# Patient Record
Sex: FEMALE | Race: WHITE | NOT HISPANIC OR LATINO | Employment: FULL TIME | URBAN - METROPOLITAN AREA
[De-identification: names, ages, dates, MRNs, and addresses within clinical notes are randomized per-mention and may not be internally consistent; named-entity substitution may affect disease eponyms.]

---

## 2023-09-18 ENCOUNTER — OFFICE VISIT (OUTPATIENT)
Dept: ALLERGY | Facility: CLINIC | Age: 23
End: 2023-09-18
Payer: COMMERCIAL

## 2023-09-18 ENCOUNTER — LAB VISIT (OUTPATIENT)
Dept: LAB | Facility: HOSPITAL | Age: 23
End: 2023-09-18
Payer: COMMERCIAL

## 2023-09-18 VITALS — HEIGHT: 65 IN | BODY MASS INDEX: 22.15 KG/M2 | WEIGHT: 132.94 LBS

## 2023-09-18 DIAGNOSIS — T78.3XXA ANGIOEDEMA, INITIAL ENCOUNTER: Primary | ICD-10-CM

## 2023-09-18 DIAGNOSIS — R10.84 GENERALIZED ABDOMINAL PAIN: ICD-10-CM

## 2023-09-18 DIAGNOSIS — R06.02 SHORTNESS OF BREATH: ICD-10-CM

## 2023-09-18 DIAGNOSIS — T78.3XXA ANGIOEDEMA, INITIAL ENCOUNTER: ICD-10-CM

## 2023-09-18 PROCEDURE — 99204 OFFICE O/P NEW MOD 45 MIN: CPT | Mod: S$GLB,,, | Performed by: ALLERGY & IMMUNOLOGY

## 2023-09-18 PROCEDURE — 1159F MED LIST DOCD IN RCRD: CPT | Mod: CPTII,S$GLB,, | Performed by: ALLERGY & IMMUNOLOGY

## 2023-09-18 PROCEDURE — 86003 ALLG SPEC IGE CRUDE XTRC EA: CPT | Mod: 59 | Performed by: ALLERGY & IMMUNOLOGY

## 2023-09-18 PROCEDURE — 86161 COMPLEMENT/FUNCTION ACTIVITY: CPT | Performed by: ALLERGY & IMMUNOLOGY

## 2023-09-18 PROCEDURE — 99204 PR OFFICE/OUTPT VISIT, NEW, LEVL IV, 45-59 MIN: ICD-10-PCS | Mod: S$GLB,,, | Performed by: ALLERGY & IMMUNOLOGY

## 2023-09-18 PROCEDURE — 1159F PR MEDICATION LIST DOCUMENTED IN MEDICAL RECORD: ICD-10-PCS | Mod: CPTII,S$GLB,, | Performed by: ALLERGY & IMMUNOLOGY

## 2023-09-18 PROCEDURE — 3008F PR BODY MASS INDEX (BMI) DOCUMENTED: ICD-10-PCS | Mod: CPTII,S$GLB,, | Performed by: ALLERGY & IMMUNOLOGY

## 2023-09-18 PROCEDURE — 86160 COMPLEMENT ANTIGEN: CPT | Performed by: ALLERGY & IMMUNOLOGY

## 2023-09-18 PROCEDURE — 88184 FLOWCYTOMETRY/ TC 1 MARKER: CPT | Performed by: ALLERGY & IMMUNOLOGY

## 2023-09-18 PROCEDURE — 99999 PR PBB SHADOW E&M-EST. PATIENT-LVL III: CPT | Mod: PBBFAC,,, | Performed by: ALLERGY & IMMUNOLOGY

## 2023-09-18 PROCEDURE — 1160F RVW MEDS BY RX/DR IN RCRD: CPT | Mod: CPTII,S$GLB,, | Performed by: ALLERGY & IMMUNOLOGY

## 2023-09-18 PROCEDURE — 1160F PR REVIEW ALL MEDS BY PRESCRIBER/CLIN PHARMACIST DOCUMENTED: ICD-10-PCS | Mod: CPTII,S$GLB,, | Performed by: ALLERGY & IMMUNOLOGY

## 2023-09-18 PROCEDURE — 36415 COLL VENOUS BLD VENIPUNCTURE: CPT | Mod: PO | Performed by: ALLERGY & IMMUNOLOGY

## 2023-09-18 PROCEDURE — 99999 PR PBB SHADOW E&M-EST. PATIENT-LVL III: ICD-10-PCS | Mod: PBBFAC,,, | Performed by: ALLERGY & IMMUNOLOGY

## 2023-09-18 PROCEDURE — 86003 ALLG SPEC IGE CRUDE XTRC EA: CPT | Performed by: ALLERGY & IMMUNOLOGY

## 2023-09-18 PROCEDURE — 3008F BODY MASS INDEX DOCD: CPT | Mod: CPTII,S$GLB,, | Performed by: ALLERGY & IMMUNOLOGY

## 2023-09-18 RX ORDER — ALBUTEROL SULFATE 90 UG/1
AEROSOL, METERED RESPIRATORY (INHALATION)
COMMUNITY
Start: 2023-04-27

## 2023-09-18 RX ORDER — NORETHINDRONE ACETATE AND ETHINYL ESTRADIOL, ETHINYL ESTRADIOL AND FERROUS FUMARATE 1MG-10(24)
1 KIT ORAL
COMMUNITY
Start: 2023-09-11

## 2023-09-18 RX ORDER — POLYETHYLENE GLYCOL 3350 17 G/17G
POWDER, FOR SOLUTION ORAL
COMMUNITY
Start: 2023-04-20

## 2023-09-18 RX ORDER — PROMETHAZINE HYDROCHLORIDE 25 MG/1
25 TABLET ORAL
COMMUNITY
Start: 2023-04-20

## 2023-09-18 RX ORDER — MONTELUKAST SODIUM 10 MG/1
10 TABLET ORAL
COMMUNITY
Start: 2023-04-27

## 2023-09-18 NOTE — PROGRESS NOTES
Subjective:       Patient ID: Edwige Valenzuela is a 22 y.o. female.    Chief Complaint:  Allergies (Concerned about food allergies)      23 yo woman presents for consult from NP Tamara Abarca for possible allergic reactions. She states this started about 2 years ago. First she developed reaction to tomato. Started off with scratchy throat then progressed to more throat tight and swelling even with tiny bite of ketchup. Then developed same to apricot but developed faster. Then 2 weeks ago had throat tightness with apple juice. And lastly was last week. She ate [employer] chicken then 2 days later had same and this time within 10 minutes throat got tight, she was wheezing and SOB. Had to be taken to ER. Each episode she had gone to ER and given epi and other med's. Pretty quickly she gets better but take couple hours to resolve completely. Never had hives. Never has face, eye or lip swelling. She does feel SOB, hard to swallow, hard to talk. No allergies prior to 2 years ago. She feels like all started after having Covid which she has had 3 times. She does vape but episodes have not been associated with vaping. She has no chronic rhinitis. No asthma. No eczema. Has been having GI issues as well since having Covid. No other medical issues.         Environmental History: see history section for home environment  Review of Systems   Constitutional:  Negative for chills and fever.   HENT:  Positive for sore throat and trouble swallowing. Negative for congestion, ear discharge, facial swelling, nosebleeds, postnasal drip, rhinorrhea, sinus pressure and sneezing.    Eyes:  Negative for discharge, redness and itching.   Respiratory:  Positive for shortness of breath.    Skin:  Negative for color change and rash.        Objective:      Physical Exam  Vitals and nursing note reviewed.   Constitutional:       General: She is not in acute distress.     Appearance: Normal appearance. She is not ill-appearing.   HENT:      Nose: No  rhinorrhea.   Eyes:      General:         Right eye: No discharge.         Left eye: No discharge.      Conjunctiva/sclera: Conjunctivae normal.   Pulmonary:      Effort: Pulmonary effort is normal. No respiratory distress.   Abdominal:      General: There is no distension.   Skin:     General: Skin is warm and dry.      Findings: No erythema or rash.   Neurological:      Mental Status: She is alert and oriented to person, place, and time.   Psychiatric:         Mood and Affect: Mood normal.         Behavior: Behavior normal.         Laboratory:   none performed   Assessment:       1. Angioedema, initial encounter    2. Shortness of breath    3. Generalized abdominal pain         Plan:       Advised pt symptoms could be allergic reaction vs laryngospasm vs esophageal issue, will send immunocaps to eval for allergy. If negative may need ENT eval  Continue to avoid tomato, apple, and apricot for now  Phone review  WILLIAM Abarca notified of completed consult via Epic    I spent a total of 45 minutes on the day of the visit.  This includes face to face time and non-face to face time preparing to see the patient (eg, review of tests), obtaining and/or reviewing separately obtained history, documenting clinical information in the electronic or other health record, independently interpreting results and communicating results to the patient/family/caregiver, or care coordinator.\

## 2023-09-21 LAB
ALMOND IGE QN: <0.1 KU/L
APPLE IGE QN: <0.1 KU/L
APRICOT IGE QN: <0.1 KU/L
AVOCADO IGE QN: <0.1 KU/L
BAHIA GRASS IGE QN: <0.1 KU/L
BALD CYPRESS IGE QN: <0.1 KU/L
BANANA IGE QN: 0.13 KU/L
BERMUDA GRASS IGE QN: <0.1 KU/L
BLACK PEPPER IGE QN: <0.1 KU/L
CELERY IGE QN: 0.29 KU/L
CHILI PEPPER IGE QN: <0.1 KU/L
COMMON RAGWEED IGE QN: <0.1 KU/L
COW MILK IGE QN: 0.28 KU/L
D FARINAE IGE QN: <0.1 KU/L
D PTERONYSS IGE QN: <0.1 KU/L
DEPRECATED ALMOND IGE RAST QL: NORMAL
DEPRECATED APPLE IGE RAST QL: NORMAL
DEPRECATED APRICOT IGE RAST QL: NORMAL
DEPRECATED AVOCADO IGE RAST QL: NORMAL
DEPRECATED BAHIA GRASS IGE RAST QL: NORMAL
DEPRECATED BALD CYPRESS IGE RAST QL: NORMAL
DEPRECATED BANANA IGE RAST QL: ABNORMAL
DEPRECATED BERMUDA GRASS IGE RAST QL: NORMAL
DEPRECATED BLACK PEPPER IGE RAST QL: NORMAL
DEPRECATED CELERY IGE RAST QL: ABNORMAL
DEPRECATED CHILI PEPPER IGE RAST QL: NORMAL
DEPRECATED COMMON RAGWEED IGE RAST QL: NORMAL
DEPRECATED COW MILK IGE RAST QL: ABNORMAL
DEPRECATED D FARINAE IGE RAST QL: NORMAL
DEPRECATED D PTERONYSS IGE RAST QL: NORMAL
DEPRECATED EGG WHITE IGE RAST QL: NORMAL
DEPRECATED ELDER IGE RAST QL: ABNORMAL
DEPRECATED GARLIC IGE RAST QL: ABNORMAL
DEPRECATED GINGER IGE RAST QL: NORMAL
DEPRECATED GREEN PEPPER IGE RAST QL: NORMAL
DEPRECATED LEMON IGE RAST QL: ABNORMAL
DEPRECATED MUSTARD IGE RAST QL: ABNORMAL
DEPRECATED OAT IGE RAST QL: ABNORMAL
DEPRECATED ONION IGE RAST QL: ABNORMAL
DEPRECATED ORANGE IGE RAST QL: NORMAL
DEPRECATED OREGANO IGE RAST QL: NORMAL
DEPRECATED PAPRIKA IGE RAST QL: NORMAL
DEPRECATED PEACH IGE RAST QL: NORMAL
DEPRECATED PEANUT IGE RAST QL: ABNORMAL
DEPRECATED PEAR IGE RAST QL: NORMAL
DEPRECATED PECAN/HICK NUT IGE RAST QL: NORMAL
DEPRECATED PECAN/HICK TREE IGE RAST QL: NORMAL
DEPRECATED PINEAPPLE IGE RAST QL: NORMAL
DEPRECATED SESAME SEED IGE RAST QL: ABNORMAL
DEPRECATED SILVER BIRCH IGE RAST QL: NORMAL
DEPRECATED SOYBEAN IGE RAST QL: ABNORMAL
DEPRECATED SUNFLOWER SEED IGE RAST QL: ABNORMAL
DEPRECATED TIMOTHY IGE RAST QL: NORMAL
DEPRECATED TOMATO IGE RAST QL: ABNORMAL
DEPRECATED WHEAT IGE RAST QL: ABNORMAL
DEPRECATED WHITE OAK IGE RAST QL: NORMAL
EGG WHITE IGE QN: <0.1 KU/L
ELDER IGE QN: 0.19 KU/L
GARLIC IGE QN: 0.13 KU/L
GINGER IGE QN: <0.1 KU/L
GREEN PEPPER IGE QN: <0.1 KU/L
LEMON IGE QN: 0.29 KU/L
MUSTARD IGE QN: 0.31 KU/L
OAT IGE QN: 0.24 KU/L
ONION IGE QN: 0.2 KU/L
ORANGE IGE QN: <0.1 KU/L
OREGANO IGE QN: <0.1 KU/L
PAPRIKA IGE QN: <0.1 KU/L
PEACH IGE QN: <0.1 KU/L
PEANUT IGE QN: 0.35 KU/L
PEAR IGE QN: <0.1 KU/L
PECAN/HICK NUT IGE QN: <0.1 KU/L
PECAN/HICK TREE IGE QN: 0.1 KU/L
PINEAPPLE IGE QN: <0.1 KU/L
SESAME SEED IGE QN: 0.26 KU/L
SILVER BIRCH IGE QN: <0.1 KU/L
SOYBEAN IGE QN: 0.32 KU/L
SUNFLOWER SEED IGE QN: 0.27 KU/L
TIMOTHY IGE QN: <0.1 KU/L
TOMATO IGE QN: 0.88 KU/L
WHEAT IGE QN: 7.62 KU/L
WHITE OAK IGE QN: <0.1 KU/L

## 2023-09-22 LAB — C1INH FUNCTIONAL/C1INH TOTAL MFR SERPL: >100 %

## 2023-09-23 LAB
DEPRECATED THYME IGE RAST QL: 0
THYME IGE QN: <0.1 KU/L

## 2023-09-26 ENCOUNTER — PATIENT MESSAGE (OUTPATIENT)
Dept: ALLERGY | Facility: CLINIC | Age: 23
End: 2023-09-26
Payer: COMMERCIAL

## 2023-09-26 LAB — C1INH SERPL-MCNC: 25 MG/DL (ref 21–39)

## 2023-09-30 LAB
BASOPHILS %, CD203C: 6.6 % (ref 0–12)
IGE RECEPTOR AB INTERPRETATION:: NORMAL

## 2025-04-22 ENCOUNTER — TELEPHONE (OUTPATIENT)
Dept: PHYSICAL MEDICINE AND REHAB | Facility: CLINIC | Age: 25
End: 2025-04-22
Payer: COMMERCIAL

## 2025-04-22 NOTE — TELEPHONE ENCOUNTER
Spoke to patient, advised of message received to schedule appointment. Patient verbalized that she scheduled appt with someone else and is no longer needing to schedule with Dr. Ramirez. Verbalized understanding.    ----- Message from Olivia sent at 4/22/2025  8:33 AM CDT -----  Contact: self  Type:  Sooner Appointment RequestCaller is requesting a sooner appointment.  Caller declined first available appointment listed below.  Caller will not accept being placed on the waitlist and is requesting a message be sent to doctor.Name of Caller:pt When is the first available appointment?Symptoms:Would the patient rather a call back or a response via [facility]? Yelp Call Back Number:[phone] Information: pt states she needs an sooner appt, next apt is in may    Please call back to advise. Thanks!

## 2025-04-24 ENCOUNTER — OFFICE VISIT (OUTPATIENT)
Dept: URGENT CARE | Facility: CLINIC | Age: 25
End: 2025-04-24

## 2025-04-24 VITALS
RESPIRATION RATE: 16 BRPM | OXYGEN SATURATION: 98 % | WEIGHT: 141 LBS | SYSTOLIC BLOOD PRESSURE: 103 MMHG | DIASTOLIC BLOOD PRESSURE: 73 MMHG | HEIGHT: 65 IN | HEART RATE: 93 BPM | BODY MASS INDEX: 23.49 KG/M2 | TEMPERATURE: 99 F

## 2025-04-24 DIAGNOSIS — Z02.6 ENCOUNTER RELATED TO WORKER'S COMPENSATION CLAIM: ICD-10-CM

## 2025-04-24 DIAGNOSIS — S00.03XA CONTUSION OF SCALP, INITIAL ENCOUNTER: ICD-10-CM

## 2025-04-24 DIAGNOSIS — R51.9 ACUTE NONINTRACTABLE HEADACHE, UNSPECIFIED HEADACHE TYPE: Primary | ICD-10-CM

## 2025-04-24 RX ORDER — METHOCARBAMOL 500 MG/1
500 TABLET, FILM COATED ORAL 2 TIMES DAILY PRN
Qty: 30 TABLET | Refills: 1 | Status: SHIPPED | OUTPATIENT
Start: 2025-04-24

## 2025-04-24 RX ORDER — LIDOCAINE 560 MG/1
PATCH PERCUTANEOUS; TOPICAL; TRANSDERMAL
Qty: 15 PATCH | Refills: 1 | Status: SHIPPED | OUTPATIENT
Start: 2025-04-24

## 2025-04-24 NOTE — PROGRESS NOTES
Subjective:      Patient ID: Edwige Valenzuela is a 24 y.o. female.    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   What have you taken OTC for your symptoms: tylenol when the pain gets really bad  What is your current pain scale out of 10? 7  Pt states that she gets really dizzy if she does too much.  She threw up an hour after she hit her head and the nausea comes and goes if she does too much.               4/22/2025-ER NOTE- The patient is a 24-year-old female who presents to the emergency department with complaints of headache with nausea and photophobia since she fell and struck the back of her head while at work yesterday.  She states she was startled which cause her to fall back and strike the corner of a wall yesterday.  She states she vomited approximately 1 hour afterward.  She states she has felt dizzy and fatigued since.  Denies chest pain or shortness breath.  Denies numbness, tingling or weakness.  No neck pain.  No chest or abdominal pain.  No swelling to extremities.  No visual changes.  She remains nauseated.    Other  This is a new problem. The current episode started in the past 7 days. The problem occurs constantly. The problem has been unchanged. Nothing aggravates the symptoms. She has tried acetaminophen for the symptoms. The treatment provided mild relief.     ROS  Objective:     Physical Exam  Constitutional:       Appearance: Normal appearance.   Musculoskeletal:         General: Normal range of motion.   Neurological:      General: No focal deficit present.      Mental Status: She is alert and oriented to person, place, and time.      Cranial Nerves: No cranial nerve deficit.      Sensory: No sensory deficit.      Motor: No weakness.      Coordination: Coordination normal.       Gait: Gait normal.      Deep Tendon Reflexes: Reflexes normal.   Psychiatric:         Mood and Affect: Mood normal.         Behavior: Behavior normal.         Thought Content: Thought content normal.         Judgment: Judgment normal.        Light sensitive, wearing sunglasses    Assessment:      1. Acute nonintractable headache, unspecified headache type    2. Encounter related to worker's compensation claim    3. Contusion of scalp, initial encounter      Plan:   Very high responses on SCAT assessment. Will keep off work at this time.   Will see back next week.     Medications Ordered This Encounter   Medications    LIDOcaine 4 % PtMd     Sig: Apply 1 patch topically to affected area 2 x per day     Dispense:  15 patch     Refill:  1    methocarbamoL (ROBAXIN) 500 MG Tab     Sig: Take 1 tablet (500 mg total) by mouth 2 (two) times daily as needed.     Dispense:  30 tablet     Refill:  1     Patient Instructions: Daily home exercises/warm soaks, Attention not to aggravate affected area   Restrictions: Disabled until next office visit  No follow-ups on file.

## 2025-04-24 NOTE — LETTER
[facility] [place] [address], [address]  [facility] [address]  Phone: [phone]  Fax: [phone]  [facility]: [id number]    Pt Name: Edwige Valenzuela  Injury Date:     Employee ID: [id number] Date of First Treatment: 04/24/2025   Company: Networked reference to record [employer]EP 1000[employer]      Appointment Time: 09:15 AM Arrived: 930   Provider: Michael Marlow MD Time Out:1035     Office Treatment:   1. Acute nonintractable headache, unspecified headache type    2. Encounter related to worker's compensation claim    3. Contusion of scalp, initial encounter      Medications Ordered This Encounter   Medications    methocarbamoL (ROBAXIN) 500 MG Tab      Patient Instructions: Daily home exercises/warm soaks, Attention not to aggravate affected area    Restrictions: Disabled until next office visit     Return Appointment: 4/28 at 8am       If Rx a medication that can be sedating, DO NOT TAKE DURING YOUR WORK DAY.    Some OTC measures to help in recovery(if no allergies to, renal issues or pregnant):  Tylenol 325mg 3x per day  Ibuprofen 400mg 3x per day OR Aleve regular strength one tablet 2x per day  Take Pepcid 10mg BID  If applicable or discussed: Magnesium OTC daily; Topical Voltaren Gel; Lidocaine patches, neoprene OTC compression sleeve, shoe inserts  Massage area if possible  Resting of the injured area  Ice for ankle, wrist or elbow injury  Elevation of the injured area if applicable  Heating pad for muscle injury  Stretching/ROM exercises as described in clinic.   ** BE ADVISED: You should be in regular contact with your W/C  to know the status of your claim and /or (if any)pending referrals**

## 2025-04-28 ENCOUNTER — OFFICE VISIT (OUTPATIENT)
Dept: URGENT CARE | Facility: CLINIC | Age: 25
End: 2025-04-28
Payer: OTHER MISCELLANEOUS

## 2025-04-28 VITALS
WEIGHT: 141 LBS | SYSTOLIC BLOOD PRESSURE: 108 MMHG | OXYGEN SATURATION: 98 % | DIASTOLIC BLOOD PRESSURE: 73 MMHG | TEMPERATURE: 99 F | BODY MASS INDEX: 23.49 KG/M2 | HEART RATE: 90 BPM | RESPIRATION RATE: 16 BRPM | HEIGHT: 65 IN

## 2025-04-28 DIAGNOSIS — R51.9 ACUTE NONINTRACTABLE HEADACHE, UNSPECIFIED HEADACHE TYPE: ICD-10-CM

## 2025-04-28 DIAGNOSIS — S00.03XA CONTUSION OF SCALP, INITIAL ENCOUNTER: ICD-10-CM

## 2025-04-28 DIAGNOSIS — Z02.6 ENCOUNTER RELATED TO WORKER'S COMPENSATION CLAIM: Primary | ICD-10-CM

## 2025-04-28 PROCEDURE — 99213 OFFICE O/P EST LOW 20 MIN: CPT | Mod: S$GLB,,, | Performed by: FAMILY MEDICINE

## 2025-04-28 NOTE — PROGRESS NOTES
Subjective:      Patient ID: Edwige Valenzuela is a 24 y.o. female.    Chief Complaint: Follow-up    Patient's place of employment - [employer]  Patient's job title - sales  Date of Injury - 4/21/2025  Body part injured - head  Current work status per last visit - Disabled until next office visit  Improved, same, or worse - improved  Pain Scale right now (1-10) - 2  Pt states that while in a conversation she is forgetting what words should go next. Pt is also still slurring her words as well.               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   What have you taken OTC for your symptoms: tylenol when the pain gets really bad  What is your current pain scale out of 10? 7  Pt states that she gets really dizzy if she does too much.  She threw up an hour after she hit her head and the nausea comes and goes if she does too much.       Other  This is a new problem. The current episode started in the past 7 days. The problem occurs constantly. The problem has been gradually improving. Nothing aggravates the symptoms. She has tried nothing for the symptoms. The treatment provided no relief.   ROS  Objective:     Physical Exam   Pt reports some persistent confusion but overall affect is much improved.   Not using sunglasses    Assessment:      1. Encounter related to worker's compensation claim    2. Contusion of scalp, initial encounter    3. Acute nonintractable headache, unspecified headache type      Plan:   Next shift in on Thursday. Will have return to clinic in the AM- expect release    Pt SCAT assessment showing positive improvement.        Patient Instructions: Daily home exercises/warm soaks, Attention not to aggravate affected area   Restrictions:  (off work until 5/1)  No follow-ups on file.

## 2025-04-28 NOTE — LETTER
[facility] [place] [address], [address]  [facility] [address]  Phone: [phone]  Fax: [phone]  [facility]: [id number]    Pt Name: Edwige Valenzuela  Injury Date:     Employee ID: [id number] Date of First Treatment: 04/28/2025   Company: Networked reference to record [employer]      Appointment Time: 12:05 PM Arrived: 1225   Provider: Michael Marlow MD Time Out:100     Office Treatment:   1. Encounter related to worker's compensation claim    2. Contusion of scalp, initial encounter    3. Acute nonintractable headache, unspecified headache type          Patient Instructions: Daily home exercises/warm soaks, Attention not to aggravate affected area    Restrictions:  (off work until 5/1)     Return Appointment: 5/1 at 8am

## 2025-05-01 ENCOUNTER — OFFICE VISIT (OUTPATIENT)
Dept: URGENT CARE | Facility: CLINIC | Age: 25
End: 2025-05-01
Payer: OTHER MISCELLANEOUS

## 2025-05-01 DIAGNOSIS — S00.03XA CONTUSION OF SCALP, INITIAL ENCOUNTER: Primary | ICD-10-CM

## 2025-05-01 DIAGNOSIS — R51.9 ACUTE NONINTRACTABLE HEADACHE, UNSPECIFIED HEADACHE TYPE: ICD-10-CM

## 2025-05-01 NOTE — LETTER
[facility] [place] [address], [address]  [facility] [address]  Phone: [phone]  Fax: [phone]  [facility]: [id number]    Pt Name: Edwige Valenzuela  Injury Date:     Employee ID: [id number] Date of First Treatment: 05/01/2025   Company: Networked reference to record [employer]EP 1000[employer]      Appointment Time: 07:45 AM Arrived: 230   Provider: Michael Marlow MD Time Out:300     Office Treatment:   1. Contusion of scalp, initial encounter    2. Acute nonintractable headache, unspecified headache type               Restrictions: Regular Duty, Discharged from Occupational Health

## 2025-05-01 NOTE — PROGRESS NOTES
Pt is her for return wc visit  Patient's place of employment - [employer]  Patient's job title - sales  Date of Injury - 4/21/2025  Body part injured - head  Current work status per last visit - Disabled until next office visit  Improved, same, or worse - improved  Pain Scale right now (1-10) - minimal headaches            PE: minimal HA but otherwise no sx.                       Contusion of scalp, initial encounter    Acute nonintractable headache, unspecified headache type      Sx have resolved. Released to full duty.